# Patient Record
Sex: MALE | Race: WHITE | NOT HISPANIC OR LATINO
[De-identification: names, ages, dates, MRNs, and addresses within clinical notes are randomized per-mention and may not be internally consistent; named-entity substitution may affect disease eponyms.]

---

## 2017-08-10 ENCOUNTER — APPOINTMENT (OUTPATIENT)
Dept: HEART AND VASCULAR | Facility: CLINIC | Age: 47
End: 2017-08-10
Payer: COMMERCIAL

## 2017-08-10 VITALS
HEART RATE: 64 BPM | TEMPERATURE: 98.5 F | BODY MASS INDEX: 25.48 KG/M2 | OXYGEN SATURATION: 97 % | DIASTOLIC BLOOD PRESSURE: 72 MMHG | SYSTOLIC BLOOD PRESSURE: 114 MMHG | RESPIRATION RATE: 15 BRPM | HEIGHT: 70 IN | WEIGHT: 178 LBS

## 2017-08-10 DIAGNOSIS — Z82.49 FAMILY HISTORY OF ISCHEMIC HEART DISEASE AND OTHER DISEASES OF THE CIRCULATORY SYSTEM: ICD-10-CM

## 2017-08-10 PROCEDURE — 99396 PREV VISIT EST AGE 40-64: CPT | Mod: 25

## 2017-08-10 PROCEDURE — 93015 CV STRESS TEST SUPVJ I&R: CPT | Mod: XE

## 2017-08-10 PROCEDURE — 93000 ELECTROCARDIOGRAM COMPLETE: CPT | Mod: 59

## 2017-08-10 PROCEDURE — 36415 COLL VENOUS BLD VENIPUNCTURE: CPT

## 2017-08-13 LAB
25(OH)D3 SERPL-MCNC: 34.7 NG/ML
ALBUMIN SERPL ELPH-MCNC: 4.7 G/DL
ALP BLD-CCNC: 78 U/L
ALT SERPL-CCNC: 27 U/L
ANION GAP SERPL CALC-SCNC: 19 MMOL/L
AST SERPL-CCNC: 26 U/L
BASOPHILS # BLD AUTO: 0.04 K/UL
BASOPHILS NFR BLD AUTO: 0.5 %
BILIRUB SERPL-MCNC: 1.6 MG/DL
BUN SERPL-MCNC: 21 MG/DL
CALCIUM SERPL-MCNC: 10.2 MG/DL
CHLORIDE SERPL-SCNC: 102 MMOL/L
CHOLEST SERPL-MCNC: 243 MG/DL
CHOLEST/HDLC SERPL: 3.9 RATIO
CO2 SERPL-SCNC: 22 MMOL/L
CREAT SERPL-MCNC: 1.28 MG/DL
CRP SERPL HS-MCNC: 1.2 MG/L
EOSINOPHIL # BLD AUTO: 0.16 K/UL
EOSINOPHIL NFR BLD AUTO: 1.9 %
GLUCOSE SERPL-MCNC: 88 MG/DL
HBA1C MFR BLD HPLC: 5.6 %
HCT VFR BLD CALC: 48.7 %
HDLC SERPL-MCNC: 63 MG/DL
HGB BLD-MCNC: 15.8 G/DL
IMM GRANULOCYTES NFR BLD AUTO: 0.6 %
LDLC SERPL CALC-MCNC: 159 MG/DL
LYMPHOCYTES # BLD AUTO: 2.03 K/UL
LYMPHOCYTES NFR BLD AUTO: 24.3 %
MAGNESIUM SERPL-MCNC: 2.2 MG/DL
MAN DIFF?: NORMAL
MCHC RBC-ENTMCNC: 30.4 PG
MCHC RBC-ENTMCNC: 32.4 GM/DL
MCV RBC AUTO: 93.8 FL
MONOCYTES # BLD AUTO: 0.86 K/UL
MONOCYTES NFR BLD AUTO: 10.3 %
NEUTROPHILS # BLD AUTO: 5.2 K/UL
NEUTROPHILS NFR BLD AUTO: 62.4 %
PLATELET # BLD AUTO: 232 K/UL
POTASSIUM SERPL-SCNC: 4.8 MMOL/L
PROT SERPL-MCNC: 7.8 G/DL
PSA FREE FLD-MCNC: 40.5
PSA FREE SERPL-MCNC: 0.15 NG/ML
PSA SERPL-MCNC: 0.37 NG/ML
RBC # BLD: 5.19 M/UL
RBC # FLD: 14.2 %
SODIUM SERPL-SCNC: 143 MMOL/L
TRIGL SERPL-MCNC: 103 MG/DL
WBC # FLD AUTO: 8.34 K/UL

## 2019-02-05 ENCOUNTER — APPOINTMENT (OUTPATIENT)
Dept: HEART AND VASCULAR | Facility: CLINIC | Age: 49
End: 2019-02-05
Payer: COMMERCIAL

## 2019-02-05 VITALS
OXYGEN SATURATION: 96 % | TEMPERATURE: 98 F | RESPIRATION RATE: 15 BRPM | HEIGHT: 70 IN | WEIGHT: 187 LBS | BODY MASS INDEX: 26.77 KG/M2 | SYSTOLIC BLOOD PRESSURE: 104 MMHG | DIASTOLIC BLOOD PRESSURE: 70 MMHG | HEART RATE: 58 BPM

## 2019-02-05 DIAGNOSIS — R14.1 GAS PAIN: ICD-10-CM

## 2019-02-05 DIAGNOSIS — R00.1 BRADYCARDIA, UNSPECIFIED: ICD-10-CM

## 2019-02-05 PROCEDURE — 93000 ELECTROCARDIOGRAM COMPLETE: CPT

## 2019-02-05 PROCEDURE — 36415 COLL VENOUS BLD VENIPUNCTURE: CPT

## 2019-02-05 PROCEDURE — 99396 PREV VISIT EST AGE 40-64: CPT

## 2019-02-05 NOTE — HISTORY OF PRESENT ILLNESS
[FreeTextEntry1] : 48 year old male  [de-identified] : generally healthy has hyperlipidemia ,  hx of low vitamin D.    Main complaint is gassy feeling without change in bowel habits, rectal bleeding or family  hx of colorectal cancer.  good energy level    Refuses flu vaccine \par \par EKG shows asymptomatic sinus bradycardia  without ectopy, ischemia   HR 56 bpm

## 2019-02-05 NOTE — PLAN
[FreeTextEntry1] : venipuncture performed  \par \par for gassy bloating I recommend Kefir 2 to 4 ounces daily \par \par increase hydration

## 2019-02-05 NOTE — ASSESSMENT
[FreeTextEntry1] : stable physical exam\par \par hx of hypovitaminosis D \par \par gassy bloating \par \par sinus bradycardia

## 2019-02-05 NOTE — HEALTH RISK ASSESSMENT
[Excellent] : ~his/her~ current health as excellent [Good] : ~his/her~  mood as  good [0] : 2) Feeling down, depressed, or hopeless: Not at all (0) [HIV test declined] : HIV test declined [Hepatitis C test declined] : Hepatitis C test declined [None] : None [Alone] : lives alone [Employed] : employed [FreeTextEntry1] : gassy abdominal discomfort  [] : No [de-identified] : socially  [Change in mental status noted] : No change in mental status noted [Language] : denies difficulty with language [Behavior] : denies difficulty with behavior [Learning/Retaining New Information] : denies difficulty learning/retaining new information [Handling Complex Tasks] : denies difficulty handling complex tasks [Reasoning] : denies difficulty with reasoning [Spatial Ability and Orientation] : denies difficulty with spatial ability and orientation [FreeTextEntry2] :

## 2019-02-08 LAB
25(OH)D3 SERPL-MCNC: 35.6 NG/ML
ALBUMIN SERPL ELPH-MCNC: 4.6 G/DL
ALP BLD-CCNC: 61 U/L
ALT SERPL-CCNC: 17 U/L
ANION GAP SERPL CALC-SCNC: 15 MMOL/L
APPEARANCE: CLEAR
AST SERPL-CCNC: 21 U/L
BASOPHILS # BLD AUTO: 0.04 K/UL
BASOPHILS NFR BLD AUTO: 0.4 %
BILIRUB SERPL-MCNC: 1.2 MG/DL
BILIRUBIN URINE: NEGATIVE
BLOOD URINE: NEGATIVE
BUN SERPL-MCNC: 23 MG/DL
CALCIUM SERPL-MCNC: 9.9 MG/DL
CHLORIDE SERPL-SCNC: 110 MMOL/L
CHOLEST SERPL-MCNC: 205 MG/DL
CHOLEST/HDLC SERPL: 4.7 RATIO
CO2 SERPL-SCNC: 28 MMOL/L
COLOR: YELLOW
CREAT SERPL-MCNC: 1.13 MG/DL
CREAT SPEC-SCNC: 95 MG/DL
EOSINOPHIL # BLD AUTO: 0.23 K/UL
EOSINOPHIL NFR BLD AUTO: 2.6 %
FOLATE SERPL-MCNC: >20 NG/ML
GLUCOSE QUALITATIVE U: NEGATIVE MG/DL
GLUCOSE SERPL-MCNC: 90 MG/DL
HBA1C MFR BLD HPLC: 5.7 %
HCT VFR BLD CALC: 47.6 %
HDLC SERPL-MCNC: 44 MG/DL
HGB BLD-MCNC: 15 G/DL
IMM GRANULOCYTES NFR BLD AUTO: 0.3 %
KETONES URINE: NEGATIVE
LDLC SERPL CALC-MCNC: 128 MG/DL
LEUKOCYTE ESTERASE URINE: NEGATIVE
LYMPHOCYTES # BLD AUTO: 2.31 K/UL
LYMPHOCYTES NFR BLD AUTO: 26 %
MAN DIFF?: NORMAL
MCHC RBC-ENTMCNC: 29.9 PG
MCHC RBC-ENTMCNC: 31.5 GM/DL
MCV RBC AUTO: 95 FL
MICROALBUMIN 24H UR DL<=1MG/L-MCNC: <1.2 MG/DL
MICROALBUMIN/CREAT 24H UR-RTO: NORMAL
MONOCYTES # BLD AUTO: 1.04 K/UL
MONOCYTES NFR BLD AUTO: 11.7 %
NEUTROPHILS # BLD AUTO: 5.25 K/UL
NEUTROPHILS NFR BLD AUTO: 59 %
NITRITE URINE: NEGATIVE
PH URINE: 6.5
PLATELET # BLD AUTO: 250 K/UL
POTASSIUM SERPL-SCNC: 5.7 MMOL/L
PROT SERPL-MCNC: 7.3 G/DL
PROTEIN URINE: NEGATIVE MG/DL
RBC # BLD: 5.01 M/UL
RBC # FLD: 13.5 %
SODIUM SERPL-SCNC: 153 MMOL/L
SPECIFIC GRAVITY URINE: 1.02
TRIGL SERPL-MCNC: 167 MG/DL
TSH SERPL-ACNC: 2.09 UIU/ML
UROBILINOGEN URINE: NEGATIVE MG/DL
VIT B12 SERPL-MCNC: 1000 PG/ML
WBC # FLD AUTO: 8.9 K/UL

## 2019-05-28 ENCOUNTER — APPOINTMENT (OUTPATIENT)
Dept: NEPHROLOGY | Facility: CLINIC | Age: 49
End: 2019-05-28

## 2020-04-10 ENCOUNTER — APPOINTMENT (OUTPATIENT)
Dept: HEART AND VASCULAR | Facility: CLINIC | Age: 50
End: 2020-04-10

## 2021-07-01 ENCOUNTER — APPOINTMENT (OUTPATIENT)
Dept: HEART AND VASCULAR | Facility: CLINIC | Age: 51
End: 2021-07-01

## 2021-07-29 ENCOUNTER — APPOINTMENT (OUTPATIENT)
Dept: HEART AND VASCULAR | Facility: CLINIC | Age: 51
End: 2021-07-29
Payer: COMMERCIAL

## 2021-07-29 DIAGNOSIS — M77.8 OTHER ENTHESOPATHIES, NOT ELSEWHERE CLASSIFIED: ICD-10-CM

## 2021-07-29 DIAGNOSIS — Z00.00 ENCOUNTER FOR GENERAL ADULT MEDICAL EXAMINATION W/OUT ABNORMAL FINDINGS: ICD-10-CM

## 2021-07-29 DIAGNOSIS — E55.9 VITAMIN D DEFICIENCY, UNSPECIFIED: ICD-10-CM

## 2021-07-29 PROCEDURE — 93000 ELECTROCARDIOGRAM COMPLETE: CPT

## 2021-07-29 PROCEDURE — 36415 COLL VENOUS BLD VENIPUNCTURE: CPT

## 2021-07-29 PROCEDURE — 99396 PREV VISIT EST AGE 40-64: CPT

## 2021-07-29 RX ORDER — ZOSTER VACCINE RECOMBINANT, ADJUVANTED 50 MCG/0.5
50 KIT INTRAMUSCULAR
Qty: 1 | Refills: 1 | Status: ACTIVE | COMMUNITY
Start: 2021-07-29 | End: 1900-01-01

## 2021-07-30 ENCOUNTER — NON-APPOINTMENT (OUTPATIENT)
Age: 51
End: 2021-07-30

## 2021-08-01 LAB
25(OH)D3 SERPL-MCNC: 25.9 NG/ML
ALBUMIN SERPL ELPH-MCNC: 4.5 G/DL
ALP BLD-CCNC: 86 U/L
ALT SERPL-CCNC: 25 U/L
ANION GAP SERPL CALC-SCNC: 14 MMOL/L
APPEARANCE: CLEAR
AST SERPL-CCNC: 25 U/L
BASOPHILS # BLD AUTO: 0.07 K/UL
BASOPHILS NFR BLD AUTO: 0.6 %
BILIRUB SERPL-MCNC: 0.8 MG/DL
BILIRUBIN URINE: NEGATIVE
BLOOD URINE: NEGATIVE
BUN SERPL-MCNC: 17 MG/DL
CALCIUM SERPL-MCNC: 9.5 MG/DL
CHLORIDE SERPL-SCNC: 100 MMOL/L
CHOLEST SERPL-MCNC: 196 MG/DL
CO2 SERPL-SCNC: 25 MMOL/L
COLOR: COLORLESS
COVID-19 NUCLEOCAPSID  GAM ANTIBODY INTERPRETATION: NEGATIVE
COVID-19 SPIKE DOMAIN ANTIBODY INTERPRETATION: POSITIVE
CREAT SERPL-MCNC: 1.18 MG/DL
CREAT SPEC-SCNC: 14 MG/DL
EOSINOPHIL # BLD AUTO: 0.25 K/UL
EOSINOPHIL NFR BLD AUTO: 2.3 %
ESTIMATED AVERAGE GLUCOSE: 111 MG/DL
GLUCOSE QUALITATIVE U: NEGATIVE
GLUCOSE SERPL-MCNC: 87 MG/DL
HBA1C MFR BLD HPLC: 5.5 %
HCT VFR BLD CALC: 47.1 %
HDLC SERPL-MCNC: 41 MG/DL
HGB BLD-MCNC: 14.9 G/DL
IMM GRANULOCYTES NFR BLD AUTO: 0.6 %
KETONES URINE: NEGATIVE
LDLC SERPL CALC-MCNC: 109 MG/DL
LEUKOCYTE ESTERASE URINE: NEGATIVE
LYMPHOCYTES # BLD AUTO: 2.49 K/UL
LYMPHOCYTES NFR BLD AUTO: 23 %
MAN DIFF?: NORMAL
MCHC RBC-ENTMCNC: 29.3 PG
MCHC RBC-ENTMCNC: 31.6 GM/DL
MCV RBC AUTO: 92.5 FL
MICROALBUMIN 24H UR DL<=1MG/L-MCNC: <1.2 MG/DL
MICROALBUMIN/CREAT 24H UR-RTO: NORMAL MG/G
MONOCYTES # BLD AUTO: 1.29 K/UL
MONOCYTES NFR BLD AUTO: 11.9 %
NEUTROPHILS # BLD AUTO: 6.64 K/UL
NEUTROPHILS NFR BLD AUTO: 61.6 %
NITRITE URINE: NEGATIVE
NONHDLC SERPL-MCNC: 154 MG/DL
PH URINE: 7
PLATELET # BLD AUTO: 244 K/UL
POTASSIUM SERPL-SCNC: 4.5 MMOL/L
PROT SERPL-MCNC: 7.1 G/DL
PROTEIN URINE: NEGATIVE
PSA FREE FLD-MCNC: 38 %
PSA FREE SERPL-MCNC: 0.13 NG/ML
PSA SERPL-MCNC: 0.34 NG/ML
RBC # BLD: 5.09 M/UL
RBC # FLD: 12.8 %
SARS-COV-2 AB SERPL IA-ACNC: >250 U/ML
SARS-COV-2 AB SERPL QL IA: 0.1 INDEX
SODIUM SERPL-SCNC: 139 MMOL/L
SPECIFIC GRAVITY URINE: 1
TRIGL SERPL-MCNC: 225 MG/DL
TSH SERPL-ACNC: 2.15 UIU/ML
UROBILINOGEN URINE: NORMAL
WBC # FLD AUTO: 10.81 K/UL

## 2021-08-02 ENCOUNTER — APPOINTMENT (OUTPATIENT)
Dept: DERMATOLOGY | Facility: CLINIC | Age: 51
End: 2021-08-02
Payer: COMMERCIAL

## 2021-08-02 ENCOUNTER — TRANSCRIPTION ENCOUNTER (OUTPATIENT)
Age: 51
End: 2021-08-02

## 2021-08-02 DIAGNOSIS — L91.8 OTHER HYPERTROPHIC DISORDERS OF THE SKIN: ICD-10-CM

## 2021-08-02 DIAGNOSIS — Z12.83 ENCOUNTER FOR SCREENING FOR MALIGNANT NEOPLASM OF SKIN: ICD-10-CM

## 2021-08-02 DIAGNOSIS — D23.9 OTHER BENIGN NEOPLASM OF SKIN, UNSPECIFIED: ICD-10-CM

## 2021-08-02 DIAGNOSIS — D22.9 MELANOCYTIC NEVI, UNSPECIFIED: ICD-10-CM

## 2021-08-02 PROBLEM — M77.8 LEFT ELBOW TENDINITIS: Status: ACTIVE | Noted: 2021-08-02

## 2021-08-02 PROCEDURE — 99203 OFFICE O/P NEW LOW 30 MIN: CPT

## 2021-08-02 NOTE — PLAN
[FreeTextEntry1] : venipuncture with A1c, lipids, vitamin D, PSA , Covid Ab, TSH, etc \par \par advised dermatology  skin cancer screening\par \par Rx provided for screening colonoscopy\par \par Shingles vaccine advised\par \par Ice left elbow and  trial Aleve bid with food  for 5 days \par \par Rx provided for  coronary calcium score for risk stratification

## 2021-08-02 NOTE — HISTORY OF PRESENT ILLNESS
[FreeTextEntry1] : moles [de-identified] : 51 yo M new patient here for above\par no hx skin cancer, last cbe with Dr Malcolm in 2016\par new spot on L foot x 1-2 years, no other concerns

## 2021-08-02 NOTE — HEALTH RISK ASSESSMENT
[Very Good] : ~his/her~  mood as very good [Yes] : Yes [No falls in past year] : Patient reported no falls in the past year [None] : None [With Significant Other] : lives with significant other [Employed] : employed [College] : College [] :  [Sexually Active] : sexually active [Feels Safe at Home] : Feels safe at home [Fully functional (bathing, dressing, toileting, transferring, walking, feeding)] : Fully functional (bathing, dressing, toileting, transferring, walking, feeding) [Fully functional (using the telephone, shopping, preparing meals, housekeeping, doing laundry, using] : Fully functional and needs no help or supervision to perform IADLs (using the telephone, shopping, preparing meals, housekeeping, doing laundry, using transportation, managing medications and managing finances) [Reports normal functional visual acuity (ie: able to read med bottle)] : Reports normal functional visual acuity [Smoke Detector] : smoke detector [Carbon Monoxide Detector] : carbon monoxide detector [FreeTextEntry1] : left elbow tendinitis  [] : No [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  [de-identified] : socially  [de-identified] : active  though less so during covid lock downs  [de-identified] : generally makes good choices  [Change in mental status noted] : No change in mental status noted [Language] : denies difficulty with language [Behavior] : denies difficulty with behavior [Learning/Retaining New Information] : denies difficulty learning/retaining new information [Handling Complex Tasks] : denies difficulty handling complex tasks [Reasoning] : denies difficulty with reasoning [Spatial Ability and Orientation] : denies difficulty with spatial ability and orientation [High Risk Behavior] : no high risk behavior [Reports changes in hearing] : Reports no changes in hearing [Reports changes in vision] : Reports no changes in vision [Reports changes in dental health] : Reports no changes in dental health [ColonoscopyComments] : overdue  for first screening colonoscopy  [FreeTextEntry2] :   JAZMIN

## 2021-08-02 NOTE — REVIEW OF SYSTEMS
[Joint Pain] : joint pain [Joint Stiffness] : joint stiffness [Mole Changes] : mole changes [Negative] : Heme/Lymph [Itching] : no itching [Nail Changes] : no nail changes [Hair Changes] : no hair changes

## 2021-08-02 NOTE — HISTORY OF PRESENT ILLNESS
[FreeTextEntry1] : annual [de-identified] :  50 year old male generally healthy has hyperlipidemia ,  hx of low vitamin D.    \par \par EKG shows NSR 70 bpm   without ectopy, ischemia   HR 56 bpm\par \par No personal hx of Covid , he completed Moderna covid vaccine series in February \par \par Main complaint is left elbow tendinitis . No known hx of tick bites, rashes, fevers, urethral discharge \par \par Writes with his left hand

## 2021-08-02 NOTE — ASSESSMENT
[FreeTextEntry1] : left elbow tendinitis \par \par hx of low vitamin D\par \par numerous moles (family hx of nonmelanoma skin cancer although pst hx  said it was melanoma) \par \par \par family hx of CAD\par \par

## 2021-08-02 NOTE — PHYSICAL EXAM
[No Acute Distress] : no acute distress [Well Nourished] : well nourished [Well Developed] : well developed [Well-Appearing] : well-appearing [Normal Voice/Communication] : normal voice/communication [Normal Sclera/Conjunctiva] : normal sclera/conjunctiva [PERRL] : pupils equal round and reactive to light [EOMI] : extraocular movements intact [Normal Outer Ear/Nose] : the outer ears and nose were normal in appearance [Normal Oropharynx] : the oropharynx was normal [Normal TMs] : both tympanic membranes were normal [No JVD] : no jugular venous distention [No Lymphadenopathy] : no lymphadenopathy [Supple] : supple [Thyroid Normal, No Nodules] : the thyroid was normal and there were no nodules present [No Respiratory Distress] : no respiratory distress  [No Accessory Muscle Use] : no accessory muscle use [Clear to Auscultation] : lungs were clear to auscultation bilaterally [Normal Rate] : normal rate  [Regular Rhythm] : with a regular rhythm [Normal S1, S2] : normal S1 and S2 [No Murmur] : no murmur heard [No Carotid Bruits] : no carotid bruits [No Abdominal Bruit] : a ~M bruit was not heard ~T in the abdomen [No Varicosities] : no varicosities [Pedal Pulses Present] : the pedal pulses are present [No Edema] : there was no peripheral edema [No Palpable Aorta] : no palpable aorta [No Extremity Clubbing/Cyanosis] : no extremity clubbing/cyanosis [Soft] : abdomen soft [Non Tender] : non-tender [Non-distended] : non-distended [No Masses] : no abdominal mass palpated [No HSM] : no HSM [Normal Bowel Sounds] : normal bowel sounds [Normal Supraclavicular Nodes] : no supraclavicular lymphadenopathy [Normal Axillary Nodes] : no axillary lymphadenopathy [Normal Posterior Cervical Nodes] : no posterior cervical lymphadenopathy [Normal Anterior Cervical Nodes] : no anterior cervical lymphadenopathy [No CVA Tenderness] : no CVA  tenderness [No Spinal Tenderness] : no spinal tenderness [No Joint Swelling] : no joint swelling [Grossly Normal Strength/Tone] : grossly normal strength/tone [No Rash] : no rash [Coordination Grossly Intact] : coordination grossly intact [No Focal Deficits] : no focal deficits [Normal Gait] : normal gait [Deep Tendon Reflexes (DTR)] : deep tendon reflexes were 2+ and symmetric [Speech Grossly Normal] : speech grossly normal [Memory Grossly Normal] : memory grossly normal [Normal Affect] : the affect was normal [Alert and Oriented x3] : oriented to person, place, and time [Normal Mood] : the mood was normal [Normal Insight/Judgement] : insight and judgment were intact [de-identified] : moles

## 2021-08-02 NOTE — PHYSICAL EXAM
[Alert] : alert [Oriented x 3] : ~L oriented x 3 [Well Nourished] : well nourished [Conjunctiva Non-injected] : conjunctiva non-injected [No Visual Lymphadenopathy] : no visual  lymphadenopathy [No Clubbing] : no clubbing [No Edema] : no edema [No Bromhidrosis] : no bromhidrosis [No Chromhidrosis] : no chromhidrosis [Full Body Skin Exam Performed] : performed [FreeTextEntry3] : firm pink brown papule that dimples with lateral pressure x 2 L foot and calf\par pedunculated tan papules neck\par \par

## 2021-08-03 RX ORDER — ZOSTER VACCINE RECOMBINANT, ADJUVANTED 50 MCG/0.5
50 KIT INTRAMUSCULAR
Qty: 1 | Refills: 0 | Status: ACTIVE | COMMUNITY
Start: 2021-08-03 | End: 1900-01-01

## 2021-08-03 RX ORDER — MELOXICAM 15 MG/1
15 TABLET ORAL DAILY
Qty: 7 | Refills: 0 | Status: ACTIVE | COMMUNITY
Start: 2021-08-03 | End: 1900-01-01

## 2021-08-10 ENCOUNTER — APPOINTMENT (OUTPATIENT)
Dept: CT IMAGING | Facility: CLINIC | Age: 51
End: 2021-08-10
Payer: SELF-PAY

## 2021-08-10 ENCOUNTER — OUTPATIENT (OUTPATIENT)
Dept: OUTPATIENT SERVICES | Facility: HOSPITAL | Age: 51
LOS: 1 days | End: 2021-08-10

## 2021-08-10 ENCOUNTER — RESULT REVIEW (OUTPATIENT)
Age: 51
End: 2021-08-10

## 2021-08-10 PROCEDURE — 75571 CT HRT W/O DYE W/CA TEST: CPT | Mod: 26

## 2021-10-12 ENCOUNTER — APPOINTMENT (OUTPATIENT)
Age: 51
End: 2021-10-12

## 2021-12-16 ENCOUNTER — APPOINTMENT (OUTPATIENT)
Age: 51
End: 2021-12-16
Payer: SELF-PAY

## 2021-12-16 ENCOUNTER — RESULT REVIEW (OUTPATIENT)
Age: 51
End: 2021-12-16

## 2021-12-16 PROCEDURE — 45380 COLONOSCOPY AND BIOPSY: CPT

## 2022-02-22 ENCOUNTER — APPOINTMENT (OUTPATIENT)
Age: 52
End: 2022-02-22

## 2022-03-28 ENCOUNTER — APPOINTMENT (OUTPATIENT)
Dept: GASTROENTEROLOGY | Facility: CLINIC | Age: 52
End: 2022-03-28
Payer: COMMERCIAL

## 2022-03-28 VITALS
BODY MASS INDEX: 26.2 KG/M2 | TEMPERATURE: 97.3 F | RESPIRATION RATE: 16 BRPM | OXYGEN SATURATION: 98 % | WEIGHT: 183 LBS | HEART RATE: 57 BPM | SYSTOLIC BLOOD PRESSURE: 123 MMHG | HEIGHT: 70 IN | DIASTOLIC BLOOD PRESSURE: 70 MMHG

## 2022-03-28 PROCEDURE — 99214 OFFICE O/P EST MOD 30 MIN: CPT | Mod: 25

## 2022-03-28 PROCEDURE — 36415 COLL VENOUS BLD VENIPUNCTURE: CPT

## 2022-03-29 NOTE — HISTORY OF PRESENT ILLNESS
[de-identified] : 52 yo M PMH recently diagnosed inflammatory colonic Crohns disease (no history of strictures or fistulas) who presents for evaluation after recent colonoscopy notable for inflammation. \par \par Patient went for a screening colonoscopy. Was not having symptoms. \par \par Colonoscopy 12/16/2021\par Segmental mild mucosal changes were found in the descending colon, at the splenic flexure, in the transverse colon, at the hepatic flexure, in the ascending colon and in the cecum secondary to colitis. Biopsied. \par Ileum normal. \par \par Cecum: mild active chronic colitis with cryptitis, no dysplasia\par Ascending colon biopsy: active chronic colitis with cyrptitis, crypt abscess and reactive lymphoid aggreatives, no dysplasia\par TV colon: active chronic colitis with cryptitis and reactive lymphoid aggregates\par Descending colon mild active chronic colitis \par Sigmoid colon normal\par rectum normal\par \par After the colonoscopy revealed inflammation, he did some reflection and notes that sometimes has diarrhea. \par \par He notes he used to have some water and a protein shake in the mornings\par For a period of 1.5 mos did not have regular BMs (very loose) which was attributed to the shake and water. Every morning was just diarrhea. Would happen intermittent. He used to have this issue but didn't really think about it until had a colonoscopy that showed inflammation.  He has changed that routine with some improvement.  \par Was wondering if that was a work out related thing (he does cross fit) \par Sometimes would have a BM between waking up and the gym. \par Switched the protein shake he was taking (was a vegan protein) and now whey and feeling better\par Once in a while diarrhea, but most of the time solid stools. THe diarrhea is on and off. \par He feels like when he sleeps has less inflammation\par Sometimes wonders if lifting heavy weights contributes - does cross fit. 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 \par \par PMH: \par recently diagnosed CD \par \par PSH :\par denies\par \par FH: \par denies any FH of GI malignancies or IBD that he is aware\par \par SH: \par prior smoking (smoked for 7 yrs from [id number]) \par Only edibles marijuana \par LSD and mushrooms - has been 3-4 mos \par Once a week has EtOH when eating out\par \par MED: \par denies\par \par ALL: \par NKDA

## 2022-03-29 NOTE — ASSESSMENT
[FreeTextEntry1] : 50 yo M PMH HLD who presents for follow up after recent colonoscopy notable for segmental inflammation and biopsies with evidence of chronic inflammation, consistent with likely diagnosis of inflammatory Crohns Colitis. \par \par Crohns Colitis: Mild on the colonoscopy and biopsies. We discussesd in depth that he likely has a diagnosis of CD based on the recent colonoscopy and pathology: not currently on medications. Not having symptoms though admits after reflecting on the past (after was told he has abnormal colonoscopy) that he does intermittently get episodes of diarrhea in the setting of dietary indiscretions and stressors. \par - discussed Stress management strategies including yoga, meditation, accupuncture\par - patient asking about diet and recommended mediterranean diet (though this is not a replacement for medications), however will help to avoid processed packaged foods such as emulsifiers, avoid red meat, avoid processed refined sugars and try to eat whole grains, healthy fats such as olive oil, and fresh foods, will refer to nutritionist\par - check routine bloodwork today including inflammatory markers, HBV, quantiferon, TPMT to have prebiologic work up\par - discussed treatment strategies however patient feels well and is very hesitant to start treatment\par - discussed that his disease activity is mild and can consider budesonide vs 5ASA vs watch and wait strategy and the risks and benefits of each strategy\par - referred to the [facility] website for more information\par - patient opted for watch and wait strategy at this time, does not feel ready to start a medication given that he is feeling well. We did discuss that there is a risk for disease progression and potentially strictures/fistulas, increased risk for requiring surgical interventions. He understands the risks associated with leaving inflammation untreated and increased risk of CRC in patients with IBD\par - plan for colonoscopy Dec 2022 to evaluate the degree of inflammation. and whether there is any progression of disease given his preference to not take any medications and just do watchful waiting. If the inflammation is worsening, he would consider starting treatment at that time per our discussion today\par - he will think about the various medications options that were discussed and follow up in 2-3 months with further questions\par - discussion about side effects of various medications\par - at follow up visit discuss evaluation with MRE vs small bowel capsule for evaluation of the small bowel\par \par HCM \par discuss at follow up visit\par \par Follow up in 3 months\par

## 2022-03-29 NOTE — HISTORY OF PRESENT ILLNESS
[de-identified] : 52 yo M PMH recently diagnosed inflammatory colonic Crohns disease (no history of strictures or fistulas) who presents for evaluation after recent colonoscopy notable for inflammation. \par \par Patient went for a screening colonoscopy. Was not having symptoms. \par \par Colonoscopy 12/16/2021\par Segmental mild mucosal changes were found in the descending colon, at the splenic flexure, in the transverse colon, at the hepatic flexure, in the ascending colon and in the cecum secondary to colitis. Biopsied. \par Ileum normal. \par \par Cecum: mild active chronic colitis with cryptitis, no dysplasia\par Ascending colon biopsy: active chronic colitis with cyrptitis, crypt abscess and reactive lymphoid aggreatives, no dysplasia\par TV colon: active chronic colitis with cryptitis and reactive lymphoid aggregates\par Descending colon mild active chronic colitis \par Sigmoid colon normal\par rectum normal\par \par After the colonoscopy revealed inflammation, he did some reflection and notes that sometimes has diarrhea. \par \par He notes he used to have some water and a protein shake in the mornings\par For a period of 1.5 mos did not have regular BMs (very loose) which was attributed to the shake and water. Every morning was just diarrhea. Would happen intermittent. He used to have this issue but didn't really think about it until had a colonoscopy that showed inflammation.  He has changed that routine with some improvement.  \par Was wondering if that was a work out related thing (he does cross fit) \par Sometimes would have a BM between waking up and the gym. \par Switched the protein shake he was taking (was a vegan protein) and now whey and feeling better\par Once in a while diarrhea, but most of the time solid stools. THe diarrhea is on and off. \par He feels like when he sleeps has less inflammation\par Sometimes wonders if lifting heavy weights contributes - does cross fit. 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 \par \par PMH: \par recently diagnosed CD \par \par PSH :\par denies\par \par FH: \par denies any FH of GI malignancies or IBD that he is aware\par \par SH: \par prior smoking (smoked for 7 yrs from [id number]) \par Only edibles marijuana \par LSD and mushrooms - has been 3-4 mos \par Once a week has EtOH when eating out\par \par MED: \par denies\par \par ALL: \par NKDA

## 2022-03-29 NOTE — ASSESSMENT
[FreeTextEntry1] : 52 yo M PMH HLD who presents for follow up after recent colonoscopy notable for segmental inflammation and biopsies with evidence of chronic inflammation, consistent with likely diagnosis of inflammatory Crohns Colitis. \par \par Crohns Colitis: Mild on the colonoscopy and biopsies. We discussesd in depth that he likely has a diagnosis of CD based on the recent colonoscopy and pathology: not currently on medications. Not having symptoms though admits after reflecting on the past (after was told he has abnormal colonoscopy) that he does intermittently get episodes of diarrhea in the setting of dietary indiscretions and stressors. \par - discussed Stress management strategies including yoga, meditation, accupuncture\par - patient asking about diet and recommended mediterranean diet (though this is not a replacement for medications), however will help to avoid processed packaged foods such as emulsifiers, avoid red meat, avoid processed refined sugars and try to eat whole grains, healthy fats such as olive oil, and fresh foods, will refer to nutritionist\par - check routine bloodwork today including inflammatory markers, HBV, quantiferon, TPMT to have prebiologic work up\par - discussed treatment strategies however patient feels well and is very hesitant to start treatment\par - discussed that his disease activity is mild and can consider budesonide vs 5ASA vs watch and wait strategy and the risks and benefits of each strategy\par - referred to the [facility] website for more information\par - patient opted for watch and wait strategy at this time, does not feel ready to start a medication given that he is feeling well. We did discuss that there is a risk for disease progression and potentially strictures/fistulas, increased risk for requiring surgical interventions. He understands the risks associated with leaving inflammation untreated and increased risk of CRC in patients with IBD\par - plan for colonoscopy Dec 2022 to evaluate the degree of inflammation. and whether there is any progression of disease given his preference to not take any medications and just do watchful waiting. If the inflammation is worsening, he would consider starting treatment at that time per our discussion today\par - he will think about the various medications options that were discussed and follow up in 2-3 months with further questions\par - discussion about side effects of various medications\par - at follow up visit discuss evaluation with MRE vs small bowel capsule for evaluation of the small bowel\par \par HCM \par discuss at follow up visit\par \par Follow up in 3 months\par

## 2022-03-31 ENCOUNTER — LABORATORY RESULT (OUTPATIENT)
Age: 52
End: 2022-03-31

## 2022-04-01 ENCOUNTER — LABORATORY RESULT (OUTPATIENT)
Age: 52
End: 2022-04-01

## 2022-04-03 LAB
ALBUMIN SERPL ELPH-MCNC: 5.1 G/DL
ALP BLD-CCNC: 68 U/L
ALT SERPL-CCNC: 15 U/L
ANION GAP SERPL CALC-SCNC: 14 MMOL/L
AST SERPL-CCNC: 21 U/L
BASOPHILS # BLD AUTO: 0.06 K/UL
BASOPHILS NFR BLD AUTO: 0.6 %
BILIRUB SERPL-MCNC: 1.1 MG/DL
BUN SERPL-MCNC: 26 MG/DL
CALCIUM SERPL-MCNC: 9.6 MG/DL
CHLORIDE SERPL-SCNC: 102 MMOL/L
CO2 SERPL-SCNC: 24 MMOL/L
CREAT SERPL-MCNC: 1.24 MG/DL
CRP SERPL-MCNC: <3 MG/L
EGFR: 70 ML/MIN/1.73M2
EOSINOPHIL # BLD AUTO: 0.16 K/UL
EOSINOPHIL NFR BLD AUTO: 1.7 %
GLUCOSE SERPL-MCNC: 93 MG/DL
HBV CORE IGG+IGM SER QL: NONREACTIVE
HBV SURFACE AB SER QL: REACTIVE
HBV SURFACE AG SER QL: NONREACTIVE
HCT VFR BLD CALC: 46.4 %
HCV AB SER QL: NONREACTIVE
HCV S/CO RATIO: 0.16 S/CO
HEPATITIS A IGG ANTIBODY: REACTIVE
HGB BLD-MCNC: 14.8 G/DL
IMM GRANULOCYTES NFR BLD AUTO: 0.2 %
LYMPHOCYTES # BLD AUTO: 2.88 K/UL
LYMPHOCYTES NFR BLD AUTO: 29.8 %
MAN DIFF?: NORMAL
MCHC RBC-ENTMCNC: 29.5 PG
MCHC RBC-ENTMCNC: 31.9 GM/DL
MCV RBC AUTO: 92.4 FL
MONOCYTES # BLD AUTO: 1.16 K/UL
MONOCYTES NFR BLD AUTO: 12 %
NEUTROPHILS # BLD AUTO: 5.38 K/UL
NEUTROPHILS NFR BLD AUTO: 55.7 %
PLATELET # BLD AUTO: 257 K/UL
POTASSIUM SERPL-SCNC: 4.6 MMOL/L
PROT SERPL-MCNC: 7.5 G/DL
RBC # BLD: 5.02 M/UL
RBC # FLD: 12.8 %
SODIUM SERPL-SCNC: 140 MMOL/L
WBC # FLD AUTO: 9.66 K/UL

## 2022-06-09 LAB
CALPROTECTIN FECAL: 151 UG/G
CELIAC DISEASE INTERPRETATION: NORMAL
CELIAC GENE PAIRS PRESENT: NO
DQ ALPHA 1: NORMAL
DQ BETA 1: NORMAL
IMMUNOGLOBULIN A (IGA): 166 MG/DL
M TB IFN-G BLD-IMP: NEGATIVE
QUANTIFERON TB PLUS MITOGEN MINUS NIL: 9.99 IU/ML
QUANTIFERON TB PLUS NIL: 0.01 IU/ML
QUANTIFERON TB PLUS TB1 MINUS NIL: 0 IU/ML
QUANTIFERON TB PLUS TB2 MINUS NIL: -0.01 IU/ML

## 2022-07-05 ENCOUNTER — APPOINTMENT (OUTPATIENT)
Dept: HEART AND VASCULAR | Facility: CLINIC | Age: 52
End: 2022-07-05

## 2022-07-05 VITALS
HEART RATE: 66 BPM | TEMPERATURE: 97.6 F | SYSTOLIC BLOOD PRESSURE: 120 MMHG | OXYGEN SATURATION: 98 % | BODY MASS INDEX: 36.12 KG/M2 | DIASTOLIC BLOOD PRESSURE: 68 MMHG | HEIGHT: 60 IN | RESPIRATION RATE: 16 BRPM | WEIGHT: 184 LBS

## 2022-07-05 DIAGNOSIS — K50.90 CROHN'S DISEASE, UNSPECIFIED, W/OUT COMPLICATIONS: ICD-10-CM

## 2022-07-05 PROCEDURE — 99212 OFFICE O/P EST SF 10 MIN: CPT

## 2022-07-05 NOTE — PHYSICAL EXAM
[Well Developed] : well developed [Normal Conjunctiva] : normal conjunctiva [Normal Gait] : normal gait [No Cyanosis] : no cyanosis [No Clubbing] : no clubbing [Moves all extremities] : moves all extremities [No Focal Deficits] : no focal deficits [Normal Speech] : normal speech [Alert and Oriented] : alert and oriented

## 2022-07-06 ENCOUNTER — APPOINTMENT (OUTPATIENT)
Dept: GASTROENTEROLOGY | Facility: CLINIC | Age: 52
End: 2022-07-06

## 2022-07-06 VITALS
DIASTOLIC BLOOD PRESSURE: 60 MMHG | TEMPERATURE: 97.2 F | SYSTOLIC BLOOD PRESSURE: 105 MMHG | OXYGEN SATURATION: 98 % | BODY MASS INDEX: 36.91 KG/M2 | HEART RATE: 62 BPM | RESPIRATION RATE: 15 BRPM | WEIGHT: 188 LBS | HEIGHT: 60 IN

## 2022-07-06 PROCEDURE — 99214 OFFICE O/P EST MOD 30 MIN: CPT

## 2022-07-06 RX ORDER — MESALAMINE 0.38 G/1
0.38 CAPSULE, EXTENDED RELEASE ORAL
Qty: 120 | Refills: 3 | Status: ACTIVE | COMMUNITY
Start: 2022-07-06 | End: 1900-01-01

## 2022-07-06 NOTE — PHYSICAL EXAM
[General Appearance - Alert] : alert [General Appearance - In No Acute Distress] : in no acute distress [Sclera] : the sclera and conjunctiva were normal [PERRL With Normal Accommodation] : pupils were equal in size, round, and reactive to light [Extraocular Movements] : extraocular movements were intact [Outer Ear] : the ears and nose were normal in appearance [Oropharynx] : the oropharynx was normal [Neck Appearance] : the appearance of the neck was normal [Neck Cervical Mass (___cm)] : no neck mass was observed [Jugular Venous Distention Increased] : there was no jugular-venous distention [Thyroid Diffuse Enlargement] : the thyroid was not enlarged [Thyroid Nodule] : there were no palpable thyroid nodules [Auscultation Breath Sounds / Voice Sounds] : lungs were clear to auscultation bilaterally [Heart Sounds] : normal S1 and S2 [Heart Rate And Rhythm] : heart rate was normal and rhythm regular [Heart Sounds Gallop] : no gallops [Murmurs] : no murmurs [Heart Sounds Pericardial Friction Rub] : no pericardial rub [Edema] : there was no peripheral edema [Abdomen Soft] : soft [Bowel Sounds] : normal bowel sounds [Abdomen Tenderness] : non-tender [Abdomen Mass (___ Cm)] : no abdominal mass palpated [Cervical Lymph Nodes Enlarged Posterior Bilaterally] : posterior cervical [Cervical Lymph Nodes Enlarged Anterior Bilaterally] : anterior cervical [No CVA Tenderness] : no ~M costovertebral angle tenderness [No Spinal Tenderness] : no spinal tenderness [Abnormal Walk] : normal gait [Nail Clubbing] : no clubbing  or cyanosis of the fingernails [Musculoskeletal - Swelling] : no joint swelling seen [Motor Tone] : muscle strength and tone were normal [Skin Color & Pigmentation] : normal skin color and pigmentation [Skin Turgor] : normal skin turgor [] : no rash [Oriented To Time, Place, And Person] : oriented to person, place, and time [Impaired Insight] : insight and judgment were intact [Affect] : the affect was normal

## 2022-07-10 NOTE — HISTORY OF PRESENT ILLNESS
[de-identified] : 50 yo M PMH recently diagnosed inflammatory colonic Crohns disease (no history of strictures or fistulas) who presents for follow up. \par \par REports having 1BM in the AM within an hour of waking up. Will have protein shake, water, feels like he gets gas. Then goes to work out and then goes again. \par \par INITIAL HPI\par 50 yo M PMH recently diagnosed inflammatory colonic Crohns disease (no history of strictures or fistulas) who presents for evaluation after recent colonoscopy notable for inflammation. \par \par Patient went for a screening colonoscopy. Was not having symptoms. \par \par Colonoscopy 12/16/2021\par Segmental mild mucosal changes were found in the descending colon, at the splenic flexure, in the transverse colon, at the hepatic flexure, in the ascending colon and in the cecum secondary to colitis. Biopsied. \par Ileum normal. \par \par Cecum: mild active chronic colitis with cryptitis, no dysplasia\par Ascending colon biopsy: active chronic colitis with cyrptitis, crypt abscess and reactive lymphoid aggreatives, no dysplasia\par TV colon: active chronic colitis with cryptitis and reactive lymphoid aggregates\par Descending colon mild active chronic colitis \par Sigmoid colon normal\par rectum normal\par \par After the colonoscopy revealed inflammation, he did some reflection and notes that sometimes has diarrhea. \par \par He notes he used to have some water and a protein shake in the mornings\par For a period of 1.5 mos did not have regular BMs (very loose) which was attributed to the shake and water. Every morning was just diarrhea. Would happen intermittent. He used to have this issue but didn't really think about it until had a colonoscopy that showed inflammation.  He has changed that routine with some improvement.  \par Was wondering if that was a work out related thing (he does cross fit) \par Sometimes would have a BM between waking up and the gym. \par Switched the protein shake he was taking (was a vegan protein) and now whey and feeling better\par Once in a while diarrhea, but most of the time solid stools. THe diarrhea is on and off. \par He feels like when he sleeps has less inflammation\par Sometimes wonders if lifting heavy weights contributes - does cross fit. 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 \par \par PMH: \par recently diagnosed CD \par \par PSH :\par denies\par \par FH: \par denies any FH of GI malignancies or IBD that he is aware\par \par SH: \par prior smoking (smoked for 7 yrs from [id number]) \par Only edibles marijuana \par LSD and mushrooms - has been 3-4 mos \par Once a week has EtOH when eating out\par \par MED: \par denies\par \par ALL: \par NKDA

## 2022-07-10 NOTE — ASSESSMENT
[FreeTextEntry1] : 50 yo M PMH HLD who presents for follow up after recent colonoscopy notable for segmental inflammation and biopsies with evidence of chronic inflammation, consistent with likely diagnosis of inflammatory Crohns Colitis. \par \par Crohns Colitis: Mild on the colonoscopy and biopsies. We discussed in depth that he likely has a diagnosis of CD based on the recent colonoscopy and pathology: not currently on medications. Previously not having symptoms only with intermittent episodes of diarrhea in the setting of dietary indiscretions and stressors. Now having symptoms of loose stools (few episodes in the morning) and has not been feeling normal, mild elevated calprotectin to 155, CRP normal\par - discussed that his disease activity is mild and can consider budesonide vs 5ASA vs watch and wait strategy and the risks and benefits of each strategy\par - plan to start mesalamine\par - if no improvement on mesalamine discussed starting budesonide and potentially transitioning to a biologic\par - discussed Stress management strategies including yoga, meditation, accupuncture\par - patient asking about diet and recommended mediterranean diet (though this is not a replacement for medications), however will help to avoid processed packaged foods such as emulsifiers, avoid red meat, avoid processed refined sugars and try to eat whole grains, healthy fats such as olive oil, and fresh foods, will refer to nutritionist\par - reviewed bloodwork drawn at last visit with the patient: calprotectin 155, CRP now, HBV immune, quantiferon negative, TPMT intermediate activity, immune to HAV, HCV negative\par - discussed treatment strategies however patient is very hesitant to start treatment, though given some symptoms recently feels more ready to consider treatment options\par - referred to the [facility] website for more information\par - We did discuss that with mesalamine/watch and wait strategy, there is a risk for disease progression and potentially strictures/fistulas, increased risk for requiring surgical interventions. He understands the risks associated with leaving inflammation untreated and increased risk of CRC in patients with IBD\par - plan for colonoscopy Dec 2022 or pending treatment course (if starting budesonide or biologic in the near future) to evaluate the degree of inflammation. and whether there is any progression of disease given his preference to start with mesalamine. If the inflammation is worsening, he would consider escalating treatment. \par - he will think about the various medications options that were discussed and follow up in 2-3 months with further questions\par - discussion about side effects of various medications\par - at follow up visit discuss evaluation with MRE vs small bowel capsule for evaluation of the small bowel\par \par HCM \par discuss at follow up visit\par \par Follow up in 3 months\par

## 2022-09-07 ENCOUNTER — APPOINTMENT (OUTPATIENT)
Dept: GASTROENTEROLOGY | Facility: CLINIC | Age: 52
End: 2022-09-07
Payer: COMMERCIAL

## 2022-09-07 VITALS
HEIGHT: 60 IN | OXYGEN SATURATION: 97 % | TEMPERATURE: 95.8 F | DIASTOLIC BLOOD PRESSURE: 90 MMHG | BODY MASS INDEX: 35.93 KG/M2 | SYSTOLIC BLOOD PRESSURE: 120 MMHG | HEART RATE: 60 BPM | WEIGHT: 183 LBS | RESPIRATION RATE: 14 BRPM

## 2022-09-07 DIAGNOSIS — R19.7 DIARRHEA, UNSPECIFIED: ICD-10-CM

## 2022-09-07 PROCEDURE — 99214 OFFICE O/P EST MOD 30 MIN: CPT

## 2022-10-10 ENCOUNTER — APPOINTMENT (OUTPATIENT)
Dept: GASTROENTEROLOGY | Facility: CLINIC | Age: 52
End: 2022-10-10
Payer: COMMERCIAL

## 2022-10-10 PROCEDURE — 99214 OFFICE O/P EST MOD 30 MIN: CPT | Mod: 95

## 2022-10-10 RX ORDER — BUDESONIDE 3 MG/1
3 CAPSULE, COATED PELLETS ORAL
Qty: 90 | Refills: 0 | Status: ACTIVE | COMMUNITY
Start: 2022-10-10 | End: 1900-01-01

## 2022-10-10 RX ORDER — CALCIUM CITRATE/VITAMIN D3 200MG-6.25
500-10 TABLET ORAL
Qty: 60 | Refills: 3 | Status: ACTIVE | COMMUNITY
Start: 2022-10-10 | End: 1900-01-01

## 2022-10-10 NOTE — HISTORY OF PRESENT ILLNESS
[Home] : at home, [unfilled] , at the time of the visit. [Medical Office: ([facility])___] : at the medical office located in  [Verbal consent obtained from patient] : the patient, [unfilled] [de-identified] : 50 yo M PMH recently diagnosed inflammatory colonic Crohns disease (no history of strictures or fistulas) who presents for follow up. Recently tried mesalamine but had worsening diarrhea with the medication concerning for possible mesalamine-induced acute intolerance syndrome. \par \par 10/10 \par Follow up visit \par \par Last visit sept 7th, took 5 days off from mesalamine. Started mesalamine again and immediately got cramps and diarrhea. Gave it a week, and got diarrhea again. \par \par Since then feels well. Has pretty normal stools. \par He does not feel any different than when he was on the mesalamine. Does feel there is something not right though. \par \par Curently having 1BM/day. Sometimes 2 BM/day. \par He changed his schedule. He sleeps later and eats at different times than before. \par Recently 1BM/day mostly. \par \par In terms of feeling weird "feels like there is a cramp in his stomach all the time". After he eats, especially dairy, won't feel good. Will feel there is a minor cramp down there. Not debilitating, But always there. Be afternoon, evening, can feel something. \par Doesn't cause him not being able to do stuff. But feels like something is not right. \par \par Reviewed labs from last visit and calprotectin 575 ug/g from 151. Other infectious work up unrevealing including negative ova and parasite, GI PCR, cdiff, giardia\par \par -----------------------------------\par PRIOR HPI\par 50 yo M PMH recently diagnosed inflammatory colonic Crohns disease (no history of strictures or fistulas) who presents for follow up. \par \par Reports he was feeling well until the past few days when he started having more loose stools and also feeling weak. This is a new symptom. Was previously taking mesalamine and feeling great. While having the loose stools stopped mesalamine, however retook it a few days later and no change in symptoms. Is starting to feel slightly better as of yesterday. \par \par Previously reports having 1BM in the AM within an hour of waking up. Will have protein shake, water, feels like he gets gas. Then goes to work out and then goes again. \par \par INITIAL HPI\par 50 yo M PMH recently diagnosed inflammatory colonic Crohns disease (no history of strictures or fistulas) who presents for evaluation after recent colonoscopy notable for inflammation. \par \par Patient went for a screening colonoscopy. Was not having symptoms. \par \par Colonoscopy 12/16/2021\par Segmental mild mucosal changes were found in the descending colon, at the splenic flexure, in the transverse colon, at the hepatic flexure, in the ascending colon and in the cecum secondary to colitis. Biopsied. \par Ileum normal. \par \par Cecum: mild active chronic colitis with cryptitis, no dysplasia\par Ascending colon biopsy: active chronic colitis with cyrptitis, crypt abscess and reactive lymphoid aggreatives, no dysplasia\par TV colon: active chronic colitis with cryptitis and reactive lymphoid aggregates\par Descending colon mild active chronic colitis \par Sigmoid colon normal\par rectum normal\par \par After the colonoscopy revealed inflammation, he did some reflection and notes that sometimes has diarrhea. \par \par He notes he used to have some water and a protein shake in the mornings\par For a period of 1.5 mos did not have regular BMs (very loose) which was attributed to the shake and water. Every morning was just diarrhea. Would happen intermittent. He used to have this issue but didn't really think about it until had a colonoscopy that showed inflammation.  He has changed that routine with some improvement.  \par Was wondering if that was a work out related thing (he does cross fit) \par Sometimes would have a BM between waking up and the gym. \par Switched the protein shake he was taking (was a vegan protein) and now whey and feeling better\par Once in a while diarrhea, but most of the time solid stools. THe diarrhea is on and off. \par He feels like when he sleeps has less inflammation\par Sometimes wonders if lifting heavy weights contributes - does cross fit. 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 \par \par PMH: \par recently diagnosed CD \par \par PSH :\par denies\par \par FH: \par denies any FH of GI malignancies or IBD that he is aware\par \par SH: \par prior smoking (smoked for 7 yrs from 9461-9140) \par Only edibles marijuana \par LSD and mushrooms - has been 3-4 mos \par Once a week has EtOH when eating out\par \par MED: \par denies\par \par ALL: \par NKDA

## 2022-10-10 NOTE — ASSESSMENT
[FreeTextEntry1] : 52 yo M PMH HLD who presents for follow up after recent colonoscopy notable for segmental inflammation and biopsies with evidence of chronic inflammation, consistent with likely diagnosis of inflammatory Crohns Colitis. Recently tried mesalamine but had worsening diarrhea with the medication concerning for possible mesalamine-induced acute intolerance syndrome. \par \par Crohns Colitis: Mild on the colonoscopy and biopsies. We discussed in depth that he likely has a diagnosis of CD based on the recent colonoscopy and pathology: not currently on medications. Previously not having symptoms only with intermittent episodes of diarrhea in the setting of dietary indiscretions and stressors. Now having symptoms of loose stools (few episodes in the morning) and has not been feeling normal, mild elevated calprotectin to 155 (though repeat calprotectin when patient was recently seen for diarrhea elevated to 500s), CRP normal\par - stool studies unremarkable for infectious cause (though may have had viral gastroenteritis or reaction to food he ate), calprotectin uptrended to 500s (may be in the setting of gastroenteritis given symptoms have now improved while off medications) \par - s/p trial of mesalamine however per patient with worsening of diarrhea\par - plan to start budesonide\par - can discuss transitioning to a biologic (though disease activity would be classified as mild at this time) \par - discussed Stress management strategies including yoga, meditation, accupuncture\par - patient asking about diet and recommended mediterranean diet (though this is not a replacement for medications), however will help to avoid processed packaged foods such as emulsifiers, avoid red meat, avoid processed refined sugars and try to eat whole grains, healthy fats such as olive oil, and fresh foods, will refer to nutritionist\par - reviewed bloodwork drawn at last visit with the patient: calprotectin 155--> 500s, CRP nml, HBV immune, quantiferon negative, TPMT intermediate activity, immune to HAV, HCV negative\par - discussed treatment strategies however patient is very hesitant to start treatment, though given some symptoms recently feels more ready to consider treatment options\par - referred to the [facility] website for more information\par - We did discuss that with mesalamine/watch and wait strategy, there is a risk for disease progression and potentially strictures/fistulas, increased risk for requiring surgical interventions. He understands the risks associated with leaving inflammation untreated and increased risk of CRC in patients with IBD\par - plan for colonoscopy Dec 2022 or pending treatment course (if starting budesonide in the near future) to evaluate the degree of inflammation. and whether there is any progression of disease given his preference to start with mesalamine. If the inflammation is worsening, he would consider escalating treatment. \par - he will think about the various medications options that were discussed and follow up in 2-3 months with further questions\par - discussion about side effects of various medications\par - at follow up visit discuss evaluation with MRE vs small bowel capsule for evaluation of the small bowel\par \par HCM \par discuss at follow up visit\par \par Follow up in 1 month\par

## 2022-12-09 ENCOUNTER — APPOINTMENT (OUTPATIENT)
Dept: GASTROENTEROLOGY | Facility: CLINIC | Age: 52
End: 2022-12-09
Payer: COMMERCIAL

## 2022-12-09 PROCEDURE — 99214 OFFICE O/P EST MOD 30 MIN: CPT | Mod: 95

## 2022-12-09 PROCEDURE — 99213 OFFICE O/P EST LOW 20 MIN: CPT | Mod: 95

## 2023-01-01 NOTE — HISTORY OF PRESENT ILLNESS
[Home] : at home, [unfilled] , at the time of the visit. [Medical Office: ([facility])___] : at the medical office located in  [Verbal consent obtained from patient] : the patient, [unfilled] [FreeTextEntry1] : 12/9 Follow up visit \par \par Has been taking 9mg budesonide daily since Thanksgiving. \par Initially started in mid October. Then stopped because went to get bivalent covid and flu shot. Stayed off for 2 weeks, then got sick with COVID. Then stayed off for a full month. Started again right after thanksgiving. \par No adverse reactions. \par Even when wasn't taking it felt well. But was eating less and resting more. \par Overall has been fine. \par He feels about the same on and off budesonide. Maybe he felt a little better in October when he initially started, it's hard to say. \par Has had normal stools. \par Sometimes the abnormal stools can be from lack of sleep or stress. \par \par \par \par  [de-identified] : 50 yo M PMH recently diagnosed inflammatory colonic Crohns disease (no history of strictures or fistulas) who presents for follow up. Recently tried mesalamine but had worsening diarrhea with the medication concerning for possible mesalamine-induced acute intolerance syndrome. \par \par 10/10 \par Follow up visit \par \par Last visit sept 7th, took 5 days off from mesalamine. Started mesalamine again and immediately got cramps and diarrhea. Gave it a week, and got diarrhea again. \par \par Since then feels well. Has pretty normal stools. \par He does not feel any different than when he was on the mesalamine. Does feel there is something not right though. \par \par Curently having 1BM/day. Sometimes 2 BM/day. \par He changed his schedule. He sleeps later and eats at different times than before. \par Recently 1BM/day mostly. \par \par In terms of feeling weird "feels like there is a cramp in his stomach all the time". After he eats, especially dairy, won't feel good. Will feel there is a minor cramp down there. Not debilitating, But always there. Be afternoon, evening, can feel something. \par Doesn't cause him not being able to do stuff. But feels like something is not right. \par \par Reviewed labs from last visit and calprotectin 575 ug/g from 151. Other infectious work up unrevealing including negative ova and parasite, GI PCR, cdiff, giardia\par \par -----------------------------------\par PRIOR HPI\par 50 yo M PMH recently diagnosed inflammatory colonic Crohns disease (no history of strictures or fistulas) who presents for follow up. \par \par Reports he was feeling well until the past few days when he started having more loose stools and also feeling weak. This is a new symptom. Was previously taking mesalamine and feeling great. While having the loose stools stopped mesalamine, however retook it a few days later and no change in symptoms. Is starting to feel slightly better as of yesterday. \par \par Previously reports having 1BM in the AM within an hour of waking up. Will have protein shake, water, feels like he gets gas. Then goes to work out and then goes again. \par \par INITIAL HPI\par 50 yo M PMH recently diagnosed inflammatory colonic Crohns disease (no history of strictures or fistulas) who presents for evaluation after recent colonoscopy notable for inflammation. \par \par Patient went for a screening colonoscopy. Was not having symptoms. \par \par Colonoscopy 12/16/2021\par Segmental mild mucosal changes were found in the descending colon, at the splenic flexure, in the transverse colon, at the hepatic flexure, in the ascending colon and in the cecum secondary to colitis. Biopsied. \par Ileum normal. \par \par Cecum: mild active chronic colitis with cryptitis, no dysplasia\par Ascending colon biopsy: active chronic colitis with cyrptitis, crypt abscess and reactive lymphoid aggreatives, no dysplasia\par TV colon: active chronic colitis with cryptitis and reactive lymphoid aggregates\par Descending colon mild active chronic colitis \par Sigmoid colon normal\par rectum normal\par \par After the colonoscopy revealed inflammation, he did some reflection and notes that sometimes has diarrhea. \par \par He notes he used to have some water and a protein shake in the mornings\par For a period of 1.5 mos did not have regular BMs (very loose) which was attributed to the shake and water. Every morning was just diarrhea. Would happen intermittent. He used to have this issue but didn't really think about it until had a colonoscopy that showed inflammation.  He has changed that routine with some improvement.  \par Was wondering if that was a work out related thing (he does cross fit) \par Sometimes would have a BM between waking up and the gym. \par Switched the protein shake he was taking (was a vegan protein) and now whey and feeling better\par Once in a while diarrhea, but most of the time solid stools. THe diarrhea is on and off. \par He feels like when he sleeps has less inflammation\par Sometimes wonders if lifting heavy weights contributes - does cross fit. 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 \par \par PMH: \par recently diagnosed CD \par \par PSH :\par denies\par \par FH: \par denies any FH of GI malignancies or IBD that he is aware\par \par SH: \par prior smoking (smoked for 7 yrs from [id number]) \par Only edibles marijuana \par LSD and mushrooms - has been 3-4 mos \par Once a week has EtOH when eating out\par \par MED: \par denies\par \par ALL: \par NKDA

## 2023-01-01 NOTE — ASSESSMENT
[FreeTextEntry1] : 51 yo M PMH HLD who presents for follow up after recent colonoscopy notable for segmental inflammation and biopsies with evidence of chronic inflammation, consistent with likely diagnosis of inflammatory Crohns Colitis. Recently tried mesalamine but had worsening diarrhea with the medication concerning for possible mesalamine-induced acute intolerance syndrome. \par \par Crohns Colitis: Mild on the colonoscopy and biopsies. We discussed in depth that he likely has a diagnosis of CD based on the recent colonoscopy and pathology: not currently on medications. Previously not having symptoms only with intermittent episodes of diarrhea in the setting of dietary indiscretions and stressors. Now having symptoms of loose stools (few episodes in the morning) and has not been feeling normal, mild elevated calprotectin to 155 (though repeat calprotectin when patient was recently seen for diarrhea elevated to 500s), CRP normal\par - stool studies unremarkable for infectious cause (though may have had viral gastroenteritis or reaction to food he ate), calprotectin uptrended to 500s (may be in the setting of gastroenteritis given symptoms have now improved while off medications) \par - s/p trial of mesalamine however per patient with worsening of diarrhea\par - s/p budesonide trial, unclear if any significant improvement, will plan to restart if recurrence of symptoms\par - can discuss transitioning to a biologic (though disease activity would be classified as mild at this time) \par - discussed Stress management strategies including yoga, meditation, accupuncture\par - patient asking about diet and recommended mediterranean diet (though this is not a replacement for medications), however will help to avoid processed packaged foods such as emulsifiers, avoid red meat, avoid processed refined sugars and try to eat whole grains, healthy fats such as olive oil, and fresh foods, will refer to nutritionist\par - reviewed bloodwork drawn at last visit with the patient: calprotectin 155--> 500s, CRP nml, HBV immune, quantiferon negative, TPMT intermediate activity, immune to HAV, HCV negative\par - discussed treatment strategies however patient is very hesitant to start treatment, though given some symptoms recently feels more ready to consider treatment options\par - referred to the [facility] website for more information\par - We did discuss that with mesalamine/watch and wait strategy, there is a risk for disease progression and potentially strictures/fistulas, increased risk for requiring surgical interventions. He understands the risks associated with leaving inflammation untreated and increased risk of CRC in patients with IBD\par - plan for colonoscopy to assess for disease activity\par - discussion about side effects of various medications\par - at follow up visit discuss evaluation with MRE vs small bowel capsule for evaluation of the small bowel\par \par HCM \par discuss at follow up visit\par \par Follow up in 1-2 mos\par

## 2023-01-02 LAB
C DIFF TOX GENS STL QL NAA+PROBE: NORMAL
CALPROTECTIN FECAL: 575 UG/G
CDIFF BY PCR: NOT DETECTED
DEPRECATED O AND P PREP STL: NORMAL
G LAMBLIA AG STL QL: NORMAL
GI PCR PANEL: NOT DETECTED